# Patient Record
Sex: MALE | Race: BLACK OR AFRICAN AMERICAN | NOT HISPANIC OR LATINO | URBAN - METROPOLITAN AREA
[De-identification: names, ages, dates, MRNs, and addresses within clinical notes are randomized per-mention and may not be internally consistent; named-entity substitution may affect disease eponyms.]

---

## 2017-02-19 ENCOUNTER — EMERGENCY (EMERGENCY)
Facility: HOSPITAL | Age: 52
LOS: 1 days | End: 2017-02-19
Attending: EMERGENCY MEDICINE | Admitting: EMERGENCY MEDICINE
Payer: MEDICAID

## 2017-02-19 VITALS
RESPIRATION RATE: 17 BRPM | TEMPERATURE: 98 F | OXYGEN SATURATION: 98 % | DIASTOLIC BLOOD PRESSURE: 65 MMHG | HEART RATE: 80 BPM | SYSTOLIC BLOOD PRESSURE: 104 MMHG

## 2017-02-19 VITALS
RESPIRATION RATE: 17 BRPM | HEIGHT: 77 IN | SYSTOLIC BLOOD PRESSURE: 122 MMHG | HEART RATE: 92 BPM | DIASTOLIC BLOOD PRESSURE: 75 MMHG | TEMPERATURE: 98 F | WEIGHT: 255.07 LBS | OXYGEN SATURATION: 97 %

## 2017-02-19 DIAGNOSIS — Z79.899 OTHER LONG TERM (CURRENT) DRUG THERAPY: ICD-10-CM

## 2017-02-19 DIAGNOSIS — M79.661 PAIN IN RIGHT LOWER LEG: ICD-10-CM

## 2017-02-19 DIAGNOSIS — Z79.891 LONG TERM (CURRENT) USE OF OPIATE ANALGESIC: ICD-10-CM

## 2017-02-19 DIAGNOSIS — S80.11XA CONTUSION OF RIGHT LOWER LEG, INITIAL ENCOUNTER: ICD-10-CM

## 2017-02-19 PROCEDURE — 73590 X-RAY EXAM OF LOWER LEG: CPT | Mod: 26,RT

## 2017-02-19 PROCEDURE — 99283 EMERGENCY DEPT VISIT LOW MDM: CPT | Mod: 25

## 2017-02-19 NOTE — ED PROVIDER NOTE - OBJECTIVE STATEMENT
51 y.o. male with pain and bruising to RLE after blunt trauma to RLE, pain with ambulation, on coumadin for hx DVT

## 2017-02-19 NOTE — ED ADULT TRIAGE NOTE - CHIEF COMPLAINT QUOTE
bibems, c/o RLE extremity injury x 30 mins ago, s/p missing a step on escalator. + redness, swelling. ice applied pta.

## 2017-06-22 NOTE — ED PROVIDER NOTE - CROS ED CARDIOVAS ALL NEG
No changes to plan of care or medication regimen  Follow up in one year, sooner if medically necessary.    negative...

## 2017-07-28 ENCOUNTER — EMERGENCY (EMERGENCY)
Facility: HOSPITAL | Age: 52
LOS: 1 days | Discharge: HOME | End: 2017-07-28
Admitting: EMERGENCY MEDICINE
Payer: MEDICAID

## 2017-07-28 VITALS
RESPIRATION RATE: 17 BRPM | SYSTOLIC BLOOD PRESSURE: 124 MMHG | TEMPERATURE: 98 F | HEART RATE: 65 BPM | DIASTOLIC BLOOD PRESSURE: 85 MMHG | OXYGEN SATURATION: 98 %

## 2017-07-28 VITALS
SYSTOLIC BLOOD PRESSURE: 134 MMHG | OXYGEN SATURATION: 98 % | HEART RATE: 63 BPM | TEMPERATURE: 98 F | DIASTOLIC BLOOD PRESSURE: 78 MMHG | RESPIRATION RATE: 18 BRPM

## 2017-07-28 LAB
ALBUMIN SERPL ELPH-MCNC: 4.2 G/DL — SIGNIFICANT CHANGE UP (ref 3.3–5)
ALP SERPL-CCNC: 93 U/L — SIGNIFICANT CHANGE UP (ref 40–120)
ALT FLD-CCNC: 45 U/L — HIGH (ref 4–41)
AMPHET UR-MCNC: NEGATIVE — SIGNIFICANT CHANGE UP
APAP SERPL-MCNC: < 15 UG/ML — LOW (ref 15–25)
APPEARANCE UR: CLEAR — SIGNIFICANT CHANGE UP
AST SERPL-CCNC: 36 U/L — SIGNIFICANT CHANGE UP (ref 4–40)
BARBITURATES MEASUREMENT: NEGATIVE — SIGNIFICANT CHANGE UP
BARBITURATES UR SCN-MCNC: NEGATIVE — SIGNIFICANT CHANGE UP
BASOPHILS # BLD AUTO: 0.05 K/UL — SIGNIFICANT CHANGE UP (ref 0–0.2)
BASOPHILS NFR BLD AUTO: 0.5 % — SIGNIFICANT CHANGE UP (ref 0–2)
BENZODIAZ SERPL-MCNC: NEGATIVE — SIGNIFICANT CHANGE UP
BENZODIAZ UR-MCNC: POSITIVE — SIGNIFICANT CHANGE UP
BILIRUB SERPL-MCNC: < 0.2 MG/DL — LOW (ref 0.2–1.2)
BILIRUB UR-MCNC: NEGATIVE — SIGNIFICANT CHANGE UP
BLOOD UR QL VISUAL: NEGATIVE — SIGNIFICANT CHANGE UP
BUN SERPL-MCNC: 15 MG/DL — SIGNIFICANT CHANGE UP (ref 7–23)
CALCIUM SERPL-MCNC: 10.1 MG/DL — SIGNIFICANT CHANGE UP (ref 8.4–10.5)
CANNABINOIDS UR-MCNC: NEGATIVE — SIGNIFICANT CHANGE UP
CHLORIDE SERPL-SCNC: 101 MMOL/L — SIGNIFICANT CHANGE UP (ref 98–107)
CO2 SERPL-SCNC: 23 MMOL/L — SIGNIFICANT CHANGE UP (ref 22–31)
COCAINE METAB.OTHER UR-MCNC: NEGATIVE — SIGNIFICANT CHANGE UP
COLOR SPEC: YELLOW — SIGNIFICANT CHANGE UP
CREAT SERPL-MCNC: 0.97 MG/DL — SIGNIFICANT CHANGE UP (ref 0.5–1.3)
EOSINOPHIL # BLD AUTO: 0.24 K/UL — SIGNIFICANT CHANGE UP (ref 0–0.5)
EOSINOPHIL NFR BLD AUTO: 2.3 % — SIGNIFICANT CHANGE UP (ref 0–6)
ETHANOL BLD-MCNC: < 10 MG/DL — SIGNIFICANT CHANGE UP
GLUCOSE SERPL-MCNC: 93 MG/DL — SIGNIFICANT CHANGE UP (ref 70–99)
GLUCOSE UR-MCNC: NEGATIVE — SIGNIFICANT CHANGE UP
HCT VFR BLD CALC: 42.4 % — SIGNIFICANT CHANGE UP (ref 39–50)
HGB BLD-MCNC: 14.1 G/DL — SIGNIFICANT CHANGE UP (ref 13–17)
IMM GRANULOCYTES # BLD AUTO: 0.02 # — SIGNIFICANT CHANGE UP
IMM GRANULOCYTES NFR BLD AUTO: 0.2 % — SIGNIFICANT CHANGE UP (ref 0–1.5)
KETONES UR-MCNC: NEGATIVE — SIGNIFICANT CHANGE UP
LEUKOCYTE ESTERASE UR-ACNC: NEGATIVE — SIGNIFICANT CHANGE UP
LYMPHOCYTES # BLD AUTO: 2.43 K/UL — SIGNIFICANT CHANGE UP (ref 1–3.3)
LYMPHOCYTES # BLD AUTO: 23.7 % — SIGNIFICANT CHANGE UP (ref 13–44)
MCHC RBC-ENTMCNC: 28.5 PG — SIGNIFICANT CHANGE UP (ref 27–34)
MCHC RBC-ENTMCNC: 33.3 % — SIGNIFICANT CHANGE UP (ref 32–36)
MCV RBC AUTO: 85.7 FL — SIGNIFICANT CHANGE UP (ref 80–100)
METHADONE UR-MCNC: POSITIVE — SIGNIFICANT CHANGE UP
MONOCYTES # BLD AUTO: 0.75 K/UL — SIGNIFICANT CHANGE UP (ref 0–0.9)
MONOCYTES NFR BLD AUTO: 7.3 % — SIGNIFICANT CHANGE UP (ref 2–14)
MUCOUS THREADS # UR AUTO: SIGNIFICANT CHANGE UP
NEUTROPHILS # BLD AUTO: 6.75 K/UL — SIGNIFICANT CHANGE UP (ref 1.8–7.4)
NEUTROPHILS NFR BLD AUTO: 66 % — SIGNIFICANT CHANGE UP (ref 43–77)
NITRITE UR-MCNC: NEGATIVE — SIGNIFICANT CHANGE UP
NRBC # FLD: 0 — SIGNIFICANT CHANGE UP
OPIATES UR-MCNC: NEGATIVE — SIGNIFICANT CHANGE UP
OXYCODONE UR-MCNC: POSITIVE — HIGH
PCP UR-MCNC: NEGATIVE — SIGNIFICANT CHANGE UP
PH UR: 6 — SIGNIFICANT CHANGE UP (ref 4.6–8)
PLATELET # BLD AUTO: 231 K/UL — SIGNIFICANT CHANGE UP (ref 150–400)
PMV BLD: 11.2 FL — SIGNIFICANT CHANGE UP (ref 7–13)
POTASSIUM SERPL-MCNC: 4.3 MMOL/L — SIGNIFICANT CHANGE UP (ref 3.5–5.3)
POTASSIUM SERPL-SCNC: 4.3 MMOL/L — SIGNIFICANT CHANGE UP (ref 3.5–5.3)
PROT SERPL-MCNC: 7.9 G/DL — SIGNIFICANT CHANGE UP (ref 6–8.3)
PROT UR-MCNC: 20 — SIGNIFICANT CHANGE UP
RBC # BLD: 4.95 M/UL — SIGNIFICANT CHANGE UP (ref 4.2–5.8)
RBC # FLD: 14.6 % — HIGH (ref 10.3–14.5)
RBC CASTS # UR COMP ASSIST: SIGNIFICANT CHANGE UP (ref 0–?)
SALICYLATES SERPL-MCNC: < 5 MG/DL — LOW (ref 15–30)
SODIUM SERPL-SCNC: 140 MMOL/L — SIGNIFICANT CHANGE UP (ref 135–145)
SP GR SPEC: 1.03 — SIGNIFICANT CHANGE UP (ref 1–1.03)
SQUAMOUS # UR AUTO: SIGNIFICANT CHANGE UP
TSH SERPL-MCNC: 1.73 UIU/ML — SIGNIFICANT CHANGE UP (ref 0.27–4.2)
UROBILINOGEN FLD QL: NORMAL E.U. — SIGNIFICANT CHANGE UP (ref 0.1–0.2)
WBC # BLD: 10.24 K/UL — SIGNIFICANT CHANGE UP (ref 3.8–10.5)
WBC # FLD AUTO: 10.24 K/UL — SIGNIFICANT CHANGE UP (ref 3.8–10.5)
WBC UR QL: SIGNIFICANT CHANGE UP (ref 0–?)

## 2017-07-28 PROCEDURE — 99284 EMERGENCY DEPT VISIT MOD MDM: CPT

## 2017-07-28 NOTE — ED ADULT NURSE NOTE - CHIEF COMPLAINT QUOTE
Pt arrives from [facility] at [place]; per EMS staff reports that pt took 3/4 tablet of Percocet 5/325 at 2:30pm this afternoon. According to EMS, staff reports that it is the policy of the residence to have pt tested for drugs after known ingestions so they called 911 to have him brought to [facility] for urine and serum toxicology before he can return to residence. Pt pleasant, calm and cooperative. Denies SI/HI; denies alcohol use and drug use other than said Percocet. Pt denies all complaints.

## 2017-07-28 NOTE — ED ADULT NURSE NOTE - OBJECTIVE STATEMENT
Bib ems from rehab [facility] s/p ingestion of 1 percocet without intent to harm self, pt states "I just wanted to relieve my pain". Pt is a&ox3, calm and cooperative. Denies s/i h/i or a/v/h. Pt denies drug/etoh use.

## 2017-07-28 NOTE — ED PROVIDER NOTE - MEDICAL DECISION MAKING DETAILS
51 y/o M hx Substance Abuse   Labs, Urine Tox/UA. No evidence of physical injuries, broken skin or deformities   Medically cleared for detox facility. No evidence of withdrawal.

## 2017-07-28 NOTE — ED ADULT TRIAGE NOTE - CHIEF COMPLAINT QUOTE
Pt arrives from [facility] at [place]; per EMS staff reports that pt took 3/4 tablet of Percocet 5/325 at 2:30pm this afternoon. According to EMS, staff reports that it is the policy of the residence to have pt tested for drugs after known ingestions so they called 911 to have him brought to [facility] for urine and serum toxicology before he can return to residence. Pt pleasant, calm and cooperative. Denies SI/HI; denies alcohol use and drug use other than said Percocet. Pt denies all complaints.

## 2017-10-15 ENCOUNTER — EMERGENCY (EMERGENCY)
Facility: HOSPITAL | Age: 52
LOS: 1 days | Discharge: HOME | End: 2017-10-15
Admitting: EMERGENCY MEDICINE
Payer: MEDICAID

## 2017-10-15 VITALS
TEMPERATURE: 99 F | SYSTOLIC BLOOD PRESSURE: 151 MMHG | OXYGEN SATURATION: 97 % | HEART RATE: 90 BPM | DIASTOLIC BLOOD PRESSURE: 88 MMHG | RESPIRATION RATE: 18 BRPM

## 2017-10-15 PROCEDURE — 99283 EMERGENCY DEPT VISIT LOW MDM: CPT | Mod: 25

## 2017-10-15 RX ORDER — IBUPROFEN 200 MG
600 TABLET ORAL ONCE
Qty: 0 | Refills: 0 | Status: COMPLETED | OUTPATIENT
Start: 2017-10-15 | End: 2017-10-15

## 2017-10-15 NOTE — ED PROVIDER NOTE - OBJECTIVE STATEMENT
51 yo M with chronic back pain, substance abuse here c/o lower back pain x "a long time", taking percocet for pain. No trauma, no numbness, no weakness, no urinary or bowel incontinence. States this is his typical pain. Poor historian and uncooperative with exam, wishing to sleep on stretcher instead, states he had 1 drink tonight. Istop checked, recent rx for percocet 60 tabs this month along with multiple rx for percocet

## 2017-10-15 NOTE — ED ADULT TRIAGE NOTE - CHIEF COMPLAINT QUOTE
patient brought in by ambulance from his place complaining of back pain radiating to the leg. + history of multiple back surgeries

## 2017-10-15 NOTE — ED PROVIDER NOTE - CONSTITUTIONAL, MLM
normal... Well appearing, well nourished, alert, oriented to person, place, time/situation and in no apparent distress. sleeping,

## 2017-10-15 NOTE — ED PROVIDER NOTE - MEDICAL DECISION MAKING DETAILS
chronic back pain, hx substance abuse, istop shows recent rx for percocet this month 60 tabs along with multiple other rx, patient is poor historian and uncooperative with history and physical, wishing to sleep instead, will not give narcotics in ED, will give motrin, advised to f/u pain management/pmd

## 2017-10-19 DIAGNOSIS — M54.5 LOW BACK PAIN: ICD-10-CM

## 2017-10-19 DIAGNOSIS — Z79.891 LONG TERM (CURRENT) USE OF OPIATE ANALGESIC: ICD-10-CM

## 2017-10-19 DIAGNOSIS — Z79.01 LONG TERM (CURRENT) USE OF ANTICOAGULANTS: ICD-10-CM

## 2017-10-19 DIAGNOSIS — G89.29 OTHER CHRONIC PAIN: ICD-10-CM

## 2018-08-03 NOTE — ED PROVIDER NOTE - NS ED ACP STMENT
ambulatory
“I have personally evaluated and examined the patient. The Attending was available to me as a supervising provider if needed.”

## 2018-10-19 NOTE — ED PROVIDER NOTE - OBJECTIVE STATEMENT
PACU
53 y/o M hx Substance Abuse BIBA w need for drug testing  before returning to alcohol rehab center. States that he takes percocet for chronic back pain. Admits that  he left the facility and took 1 percocet. States that he was later refused entry to the program. Denies falling, punching or kicking any objects. Denies SI/HI/AH/VH. Denies pain, SOB, fever, chest/abdominal discomfort. Denies recent use of alcohol or illicit drugs.

## 2019-06-10 NOTE — ED PROVIDER NOTE - CPE EDP GASTRO NORM
Problem: Adult Inpatient Plan of Care  Goal: Plan of Care Review  Outcome: Ongoing (interventions implemented as appropriate)  AAOx3, afebrile, c/o incisional pain. Telemetry monitor in place (NSR). BG monitored achs 2am. Insulin gtt @0.4 units/hr. Incision care and self meds 100%. Pt still with minimal UOP thus far. Nephrology consulted yesterday. Wound care consults for folds and buttocks areas. Pt able to position self independently in bed. Pt in lowest position, side rails up x2, non-skid foot wear in place, call light within reach, pt verbalized understanding to call RN when needed. Hand hygiene practiced per protocol. Will continue to monitor.          normal...

## 2021-11-01 NOTE — ED PROVIDER NOTE - SKIN, MLM
[place] ambulatory encounter:  [facility] [address]  [address] [place] [address]  [phone]    Assessment/PLAN:    1. Sore throat        No orders of the defined types were placed in this encounter.   see Smartchart activity for details.    At this point sore throat has improved.  I did review that he had a negative COVID test.  Work note excusing October 18th, October 25th in October 26 given as he missed work due to his sore throat.  Follow-up as needed.  Over-the-counter analgesics if needed.  Sounds like some of this may be related to a canker sore that was diagnosed by his dentist.        Patient Instructions   · Monitor symptoms  · Return to work note given  · OTC analgesics  · Follow up as needed      Diagnosis and plan discussed with the patient.  The patient indicates understanding these issues and agrees with the plan.       SUBJECTIVE:  Chief Complaint   Patient presents with   • Follow-up     pt needs work note, f/u sore throat     He is a 68 year old male who presented requesting evaluation for sore throat that he had in October.  Patient missed work on October 18th, October 25 and October 26. Patient was to see ENT however he had acute respiratory symptoms and was referred to urgent care just for COVID testing.  This returned negative.  Patient the meantime with side dentist who diagnosed him with a canker sore and his sore throat has improved.  Patient missed work and would like a work note.  Denies any fevers, chills, diarrhea, nausea, vomiting, cough, chest pain or shortness of breath.  He is using over-the-counter analgesics.      PAST HISTORIES:  I have reviewed the past medical history, medications, allergies and social history listed in the medical record as well as the nursing notes for this encounter.    MEDICATIONS:  Current Outpatient Medications   Medication Sig Dispense Refill   •  cephalexin (KEFLEX) 500 MG capsule Take 4 capsules by mouth 1 hour before dental appointment. 20 capsule 0   • metFORMIN (GLUCOPHAGE) 500 MG tablet Take 1 tablet by mouth daily with breakfast. 90 tablet 0   • simvastatin (ZOCOR) 20 MG tablet Take 1 tablet by mouth nightly. 90 tablet 1   • levothyroxine 100 MCG tablet Take 1 tablet by mouth daily. 90 tablet 3   • blood glucose test strip Test blood sugar 1 times daily as directed. 50 each 5   • Cholecalciferol (VITAMIN D3 PO)      • famotidine (PEPCID) 10 MG tablet Take 1 tablet by mouth twice a day prior to breakfast and evening meal. 60 tablet 11   • aspirin 81 MG tablet Take 81 mg by mouth daily.     • Omega-3 Fatty Acids (FISH OIL) 1000 MG capsule Take 2 g by mouth daily.     • Calcium Carbonate Antacid (CALCIUM ANTACID PO) Take 1 tablet by mouth every other day.     • DISPENSE Dispense One Touch ultra meter. 1 each 0   • DISPENSE One Touch Ultra lancets. Test once daily. DX> 250.02 100 each 1   • Multiple Vitamin (MULTI-VITAMIN) tablet Take 1 tablet by mouth every other day. Indications: patient's takes every other day      • Ascorbic Acid (VITAMIN C) 100 MG tablet Take 100 mg by mouth daily.       No current facility-administered medications for this visit.       ALLERGIES:  Allergies as of 11/01/2021   • (No Known Allergies)       REVIEW OF SYSTEMS:  As noted above.    OBJECTIVE:    PHYSICAL EXAM:  Blood pressure 122/76, pulse 96, temperature 95.7 °F (35.4 °C), height 5' 7\" (1.702 m), weight 87.1 kg (192 lb), SpO2 97 %.    CONSTITUTIONAL:  The patient is a well-developed, well-nourished male in no obvious distress.  There is no jaundice, anemia, cyanosis or respiratory distress.  HEENT:  TMs (Tympanic membranes) are clear bilaterally.   External ears without deformities.  Hearing is grossly normal.  Nose without deformities.  There is moist nasal mucosa.  Throat reveals no erythema.  Moist mucous membranes.  Lips without lesions.  NECK:  Without  lymphadenopathy.  There is full range of motion.  There is no tenderness noted.  CHEST:  Without any deformities.  Movement of the left side equals that of the right, which is within normal limits.  LUNGS:  Clear to auscultation bilaterally.  There are no wheezes or rhonchi noted.  CARDIOVASCULAR:  Regular rate and rhythm with normal S1 plus S2.  There are no murmurs auscultated.  SKIN:  Warm, dry, intact without any obvious rashes.  NEUROLOGIC:  Alert and oriented x 3.  PSYCHIATRIC:  Speech and behavior appropriate.  Mood and affect are normal.    DIAGNOSTICS:   None     Skin normal color for race, warm, dry and intact. No evidence of rash.

## 2024-06-14 ENCOUNTER — EMERGENCY (EMERGENCY)
Facility: HOSPITAL | Age: 59
LOS: 1 days | Discharge: HOME | End: 2024-06-14
Admitting: EMERGENCY MEDICINE
Payer: MEDICAID

## 2024-06-14 VITALS
HEART RATE: 92 BPM | SYSTOLIC BLOOD PRESSURE: 112 MMHG | OXYGEN SATURATION: 94 % | DIASTOLIC BLOOD PRESSURE: 70 MMHG | RESPIRATION RATE: 16 BRPM | WEIGHT: 246.92 LBS | TEMPERATURE: 98 F

## 2024-06-14 DIAGNOSIS — M54.9 DORSALGIA, UNSPECIFIED: ICD-10-CM

## 2024-06-14 DIAGNOSIS — M54.50 LOW BACK PAIN, UNSPECIFIED: ICD-10-CM

## 2024-06-14 PROCEDURE — 99284 EMERGENCY DEPT VISIT MOD MDM: CPT

## 2024-06-14 RX ORDER — KETOROLAC TROMETHAMINE 30 MG/ML
15 SYRINGE (ML) INJECTION ONCE
Refills: 0 | Status: DISCONTINUED | OUTPATIENT
Start: 2024-06-14 | End: 2024-06-14

## 2024-06-14 RX ADMIN — Medication 15 MILLIGRAM(S): at 16:37

## 2024-06-14 NOTE — ED PROVIDER NOTE - OBJECTIVE STATEMENT
59-year-old male patient here  with concerns of back pain.  Patient states he normally has long history of back pain but  this flared up again today.  Reports pain to his  right lower lumbar region, worse with some movements/twisting/bending, nonradiating.  Denies any chest pain, abdominal pain,  nausea, vomiting, diarrhea,  syncope, numbness, tingling,  weakness or any other symptoms. 59-year-old male patient here  with concerns of back pain.  Patient states he normally has long history of back pain with prior surgery but  this flared up again today. Denies any fall injury or trauma. Reports pain to his right lower lumbar region, worse with some movements/twisting/bending, nonradiating.  Denies any chest pain, abdominal pain,  nausea, vomiting, diarrhea,  syncope, numbness, tingling,  weakness or any other symptoms.

## 2024-06-14 NOTE — ED PROVIDER NOTE - NSICDXPASTMEDICALHX_GEN_ALL_CORE_FT
PAST MEDICAL HISTORY:  Chronic back pain     DVT (deep venous thrombosis)     Osteomyelitis     Substance abuse

## 2024-06-14 NOTE — ED PROVIDER NOTE - NS ED ROS FT
REVIEW OF SYSTEMS:    CONSTITUTIONAL: no fevers, chills, recent weight loss, night sweats   HEENT: no eye pain, nasal congestion, rhinorrhea, sore throat   CV: no chest pain, palpitations   PULM: no coughing, SOA, wheezes, pleuritic pain, hemoptysis    GI: no abd pain, n/v/d, constipation, hematemesis, bloody stools, dark/tarry stools   : no flank pain, dysuria, hematuria    MS: no extremity pain, neck pain  SKIN: no rash   NEURO: no headache, photophobia, phonophobia, vision changes, focal weakness, numbness/tingling, syncope   PSYCH: no SI/HI

## 2024-06-14 NOTE — ED PROVIDER NOTE - PATIENT PORTAL LINK FT
You can access the FollowMyHealth Patient Portal offered by [facility] by registering at the following website: [email or web address]. By joining [employer]’s FollowMyHealth portal, you will also be able to view your health information using other applications (apps) compatible with our system.

## 2024-06-14 NOTE — ED PROVIDER NOTE - CLINICAL SUMMARY MEDICAL DECISION MAKING FREE TEXT BOX
back pain, acute on chronic, no red flag sx, offered meds for pain, pt politely declines, would like to sleep/rest, wants some food   Discussed signs and symptoms to immediately return to the ER. Discussed ED findings, plan, home care instructions, and follow up. Patient feels comfortable with discharge at this time, all questions answered, understands when to return and follow up, agrees with plan of care as discussed, stable for discharge. back pain, acute on chronic, no red flag sx, offered meds for pain, pt politely declines but later would like a shot, would like to sleep/rest, wants some food   Discussed signs and symptoms to immediately return to the ER. Discussed ED findings, plan, home care instructions, and follow up. Patient feels comfortable with discharge at this time, all questions answered, understands when to return and follow up, agrees with plan of care as discussed, stable for discharge. back pain, acute on chronic, no red flag sx, offered meds for pain, pt politely declines initially but later would like a shot, would like to sleep/rest, wants some food     Patient is NAD, well appearing, vitals signs stable.      I personally performed 3 separate reevaluation's during the patient's ED visit.  The patient did improved and remained stable throughout the ED visit after ED interventions.   More comfortable throughout visit, sitting up on his own, eating, drinking     Discussed signs and symptoms to immediately return to the ER. Discussed ED findings, plan, home care instructions, and follow up. Patient feels comfortable with discharge at this time, all questions answered, understands when to return and follow up, agrees with plan of care as discussed, stable for discharge.

## 2024-06-14 NOTE — ED ADULT NURSE NOTE - OBJECTIVE STATEMENT
Patient is a 58 y/o M c/o back pain/injury. patient reports low back pain since prior to arrival. Patient reports hx of chronic back pain. patient denies incontinence.

## 2024-06-14 NOTE — ED PROVIDER NOTE - PHYSICAL EXAMINATION
CONSTITUTIONAL   General appearance: looks well, no acute distress, alert, interactive, pleasant, answers questions appropriately     EYES  RIGHT eye: Normal Conjunctiva and Lid NO injection, NO discharge, NO lid swelling, NO lid erythema, NO lid tenderness, NO periorbital erythema or swelling, NO raccoon eyes, NO hyphema    LEFT eye: Normal Conjuctiva and Lid NO injection, NO discharge, NO lid swelling, NO lid erythema, NO lid tenderness, NO periorbital erythema or swelling, NO raccoon eyes, NO hyphema    Sclerae: NO subconjunctival hemorrhage, No scleral icterus   Pupils PERRL, NO hyphema   EOM: EOMI, NO nystagmus     EAR / NOSE / THROAT   Oropharynx: Moist mucous membranes, Airway Patent, NO erythema, NO exudate, NO sores / lesions, NO tonsillar swelling, NO swelling posterior pharynx or tongue, NO drooling, stridor, or muffled voice, uvula midline, NO uvular edema   RIGHT Ear: NO TM erythema, NO effusion, NO perforation, NO canal erythema, NO canal narrowing, NO canal discharge, NO pain w/ tugging on ear, NO cerumen impaction, NO hemotympanum   LEFT Ear: NO TM erythema, NO effusion, NO perforation, NO canal erythema, NO canal narrowing, NO canal discharge, NO pain w/ tugging on ear, NO cerumen impaction, NO hemotympanum   External Ear: NO erythema, NO swelling, NO hematoma, NO lesions   Sinus: NO sinus tenderness   Nose: NO turbinate swelling, NO nasal discharge, NO tenderness, NO deformity, NO septal hematoma, NO source of active bleeding     NECK   Supple, no meningismus, trachea midline, no masses, full ROM     CARDIOVASCULAR   Heart Auscultation: RRR, Normal S1, S2 heart sounds, No murmurs, rubs or gallops   Distal pulses palpable     LUNGS   Auscultation: breath sounds normal, good air movement, NO wheezing, NO rales or crackles, NO rhonchi or coarse BS   Respiratory effort: No respiratory distress, normal respiration effort, speaking in Full Sentences   Chest Wall: Symmetric chest wall expansion, No chest wall tenderness, No crepitus     ABDOMEN   Soft, NO tenderness, NO mass or distension, NO guarding or rebound, normal bowel sounds, negative Newton's sign, NO tenderness RLQ, NO organomegaly, No flank tenderness, NO pulsatile mass     LYMPHATIC   NO Anterior cervical adenopathy, NO Posterior cervical adenopathy, NO Submandibular adenopathy, NO Supraclavicular adenopathy     SKIN   Normal color, NO rash, NO erythema, NO bruising, NO skin lesion     PSYCHIATRIC   Judgement and insight intact, normal mood and affect, patient at baseline MS     NEUROLOGIC    Alert and oriented x 3, Speech normal, No focal deficits, normal motor and sensory, MAEx4   GCS 15      MSK - HEAD   Head: AT/NC, NO tenderness, NO soft tissue swelling, NO laceration   Face: NO deformity, NO tenderness, NO crepitus, NO soft tissue swelling, NO bruising, NO Battles sign, NO erythema, NO warmth, NO laceration, NO skin wound   External EAR: NO deformity, NO hematoma, NO laceration   Nose: NO deformity, NO soft tissue swelling, NO septal hematoma, NO tenderness, NO bruising, NO laceration, NO epistaxis    Lips & Teeth: NO Lip swelling, NO skin lesion, NO laceration, [place] border intact, Teeth intact with no mobility, NO dental injury, NO malocclusion      MSK - Spine   CERVICAL Spine: NO swelling, NO midline vertebral tenderness, NO paravertebral tenderness, NO muscle spasm, NO trapezius tenderness, NO bruising, NO erythema, NO warmth, NO skin wound, NO deformity, full ROM, motor exam normal, sensation intact   THORACIC Spine: NO swelling, NO midline vertebral tenderness, NO paravertebral tenderness, NO muscle spasm, NO bruising, NO erythema, NO warmth, NO skin wound, NO deformity, full ROM, motor exam normal, sensation intact   LUMBAR / COCCYX: Spine NO swelling, NO midline vertebral tenderness, + right lumbar paravertebral tenderness, NO muscle spasm, NO bruising, NO erythema, NO warmth, NO skin wound, NO deformity, full ROM, NEGATIVE straight leg raise, NO sciatic notch tenderness, NO SI joint tenderness, NO coccyx tenderness, motor exam normal, sensation intact, NO flank tenderness   PELVIS: stable, nontender, no obvious deformities

## 2024-06-15 PROBLEM — F19.10 OTHER PSYCHOACTIVE SUBSTANCE ABUSE, UNCOMPLICATED: Chronic | Status: ACTIVE | Noted: 2017-07-29
